# Patient Record
Sex: MALE | Race: WHITE
[De-identification: names, ages, dates, MRNs, and addresses within clinical notes are randomized per-mention and may not be internally consistent; named-entity substitution may affect disease eponyms.]

---

## 2018-12-12 ENCOUNTER — HOSPITAL ENCOUNTER (OUTPATIENT)
Dept: HOSPITAL 19 - COL.RAD | Age: 68
End: 2018-12-12
Attending: UROLOGY
Payer: MEDICARE

## 2018-12-12 DIAGNOSIS — R93.7: ICD-10-CM

## 2018-12-12 DIAGNOSIS — C61: Primary | ICD-10-CM

## 2018-12-12 PROCEDURE — A9503 TC99M MEDRONATE: HCPCS

## 2019-03-01 ENCOUNTER — HOSPITAL ENCOUNTER (INPATIENT)
Dept: HOSPITAL 19 - INPTSU | Age: 69
LOS: 27 days | Discharge: HOME | DRG: 708 | End: 2019-03-28
Attending: UROLOGY | Admitting: UROLOGY
Payer: MEDICARE

## 2019-03-01 VITALS — WEIGHT: 220.46 LBS | HEIGHT: 71 IN | BODY MASS INDEX: 30.86 KG/M2

## 2019-03-01 DIAGNOSIS — E78.5: ICD-10-CM

## 2019-03-01 DIAGNOSIS — I10: ICD-10-CM

## 2019-03-01 DIAGNOSIS — C61: Primary | ICD-10-CM

## 2019-03-01 PROCEDURE — A9284 NON-ELECTRONIC SPIROMETER: HCPCS

## 2019-03-27 VITALS — SYSTOLIC BLOOD PRESSURE: 115 MMHG | HEART RATE: 94 BPM | DIASTOLIC BLOOD PRESSURE: 65 MMHG

## 2019-03-27 VITALS — SYSTOLIC BLOOD PRESSURE: 110 MMHG | HEART RATE: 78 BPM | TEMPERATURE: 98 F | DIASTOLIC BLOOD PRESSURE: 62 MMHG

## 2019-03-27 VITALS — SYSTOLIC BLOOD PRESSURE: 113 MMHG | DIASTOLIC BLOOD PRESSURE: 67 MMHG | TEMPERATURE: 98.2 F | HEART RATE: 92 BPM

## 2019-03-27 VITALS — HEART RATE: 107 BPM | DIASTOLIC BLOOD PRESSURE: 75 MMHG | TEMPERATURE: 100 F | SYSTOLIC BLOOD PRESSURE: 122 MMHG

## 2019-03-27 VITALS — DIASTOLIC BLOOD PRESSURE: 68 MMHG | TEMPERATURE: 98.2 F | SYSTOLIC BLOOD PRESSURE: 117 MMHG | HEART RATE: 82 BPM

## 2019-03-27 VITALS — DIASTOLIC BLOOD PRESSURE: 78 MMHG | TEMPERATURE: 98.2 F | HEART RATE: 93 BPM | SYSTOLIC BLOOD PRESSURE: 124 MMHG

## 2019-03-27 VITALS — SYSTOLIC BLOOD PRESSURE: 112 MMHG | HEART RATE: 82 BPM | DIASTOLIC BLOOD PRESSURE: 61 MMHG

## 2019-03-27 VITALS — DIASTOLIC BLOOD PRESSURE: 71 MMHG | HEART RATE: 81 BPM | TEMPERATURE: 98.4 F | SYSTOLIC BLOOD PRESSURE: 113 MMHG

## 2019-03-27 VITALS — SYSTOLIC BLOOD PRESSURE: 126 MMHG | HEART RATE: 101 BPM | DIASTOLIC BLOOD PRESSURE: 74 MMHG | TEMPERATURE: 97.5 F

## 2019-03-27 VITALS — SYSTOLIC BLOOD PRESSURE: 125 MMHG | HEART RATE: 85 BPM | DIASTOLIC BLOOD PRESSURE: 67 MMHG

## 2019-03-27 VITALS — SYSTOLIC BLOOD PRESSURE: 117 MMHG | HEART RATE: 94 BPM | DIASTOLIC BLOOD PRESSURE: 67 MMHG

## 2019-03-27 VITALS — DIASTOLIC BLOOD PRESSURE: 92 MMHG | SYSTOLIC BLOOD PRESSURE: 148 MMHG | TEMPERATURE: 97.3 F | HEART RATE: 70 BPM

## 2019-03-27 VITALS — TEMPERATURE: 98.3 F

## 2019-03-27 PROCEDURE — 0VT04ZZ RESECTION OF PROSTATE, PERCUTANEOUS ENDOSCOPIC APPROACH: ICD-10-PCS | Performed by: UROLOGY

## 2019-03-27 PROCEDURE — 8E0W4CZ ROBOTIC ASSISTED PROCEDURE OF TRUNK REGION, PERCUTANEOUS ENDOSCOPIC APPROACH: ICD-10-PCS | Performed by: UROLOGY

## 2019-03-27 PROCEDURE — 07TC4ZZ RESECTION OF PELVIS LYMPHATIC, PERCUTANEOUS ENDOSCOPIC APPROACH: ICD-10-PCS | Performed by: UROLOGY

## 2019-03-27 NOTE — NUR
Patient arrived to floor via bed. Abdomen has 5 lapsites and a katheryn drain
site. Denies nausea. Dressing to drain site is C/D/I. Leal secured to leg,
urine output is peach/red tinged with sediment. His wife is at bedside.
Patient denies pain. No other changes at this time. Call light within reach.

## 2019-03-27 NOTE — NUR
5 lap sites are dry and clean, open to air. no pain at site. katheryn drain is
draining minimal drainage at this time. wife at bedside. no needs at this
time. call light in reach

## 2019-03-27 NOTE — NUR
PT RESTIGN IN BED A=Ox4. no pain. no n/v. reports no gas or bm. no needs at
this time. call light inrech

## 2019-03-27 NOTE — NUR
Patient was able to get up to the chair. He is tolerating clear liqdis well.
Denies nausea. No complaints of pain. Incisions remain intace. No other
changes at this time. Call light within reach.

## 2019-03-27 NOTE — NUR
pt incision directly above umbilicus slightly red, open to air. no pain. no
N/V no needs at this time.

## 2019-03-27 NOTE — NUR
DEONNAARIELNT ARRVES AMBULATORY TO BAY 8. ALERT AND ORIENTED X 3. LABS DRAWN. IV
STARTED. VS TAKEN. CONSENTS SIGNED. WIFE AT BEDSIDE. CALL LIGHT WITHIN REACH.
WILL CONTINUE TO MONITOR. WIFE HAS PATIENTS BELONGINGS.

## 2019-03-28 VITALS — DIASTOLIC BLOOD PRESSURE: 72 MMHG | SYSTOLIC BLOOD PRESSURE: 123 MMHG | HEART RATE: 87 BPM | TEMPERATURE: 98.4 F

## 2019-03-28 VITALS — HEART RATE: 84 BPM | TEMPERATURE: 98.2 F | SYSTOLIC BLOOD PRESSURE: 124 MMHG | DIASTOLIC BLOOD PRESSURE: 78 MMHG

## 2019-03-28 VITALS — DIASTOLIC BLOOD PRESSURE: 89 MMHG | TEMPERATURE: 97.5 F | SYSTOLIC BLOOD PRESSURE: 134 MMHG | HEART RATE: 81 BPM

## 2019-03-28 VITALS — HEART RATE: 81 BPM | DIASTOLIC BLOOD PRESSURE: 69 MMHG | TEMPERATURE: 98.3 F | SYSTOLIC BLOOD PRESSURE: 118 MMHG

## 2019-03-28 LAB
ANION GAP SERPL CALC-SCNC: 5 MMOL/L (ref 7–16)
BASOPHILS # BLD: 0 10*3/UL (ref 0–0.2)
BASOPHILS NFR BLD AUTO: 0.1 % (ref 0–2)
BUN SERPL-MCNC: 11 MG/DL (ref 9–20)
CALCIUM SERPL-MCNC: 8.3 MG/DL (ref 8.4–10.2)
CHLORIDE SERPL-SCNC: 104 MMOL/L (ref 98–107)
CO2 SERPL-SCNC: 25 MMOL/L (ref 22–30)
CREAT SERPL-SCNC: 0.78 UMOL/L (ref 0.66–1.25)
EOSINOPHIL # BLD: 0 10*3/UL (ref 0–0.7)
EOSINOPHIL NFR BLD: 0.1 % (ref 0–4)
ERYTHROCYTE [DISTWIDTH] IN BLOOD BY AUTOMATED COUNT: 12.3 % (ref 11.5–14.5)
GLUCOSE SERPL-MCNC: 139 MG/DL (ref 74–106)
GRANULOCYTES # BLD AUTO: 69.7 % (ref 42.2–75.2)
HCT VFR BLD AUTO: 39 % (ref 42–52)
HGB BLD-MCNC: 13.4 G/DL (ref 13.5–18)
LYMPHOCYTES # BLD: 1.9 10*3/UL (ref 1.2–3.4)
LYMPHOCYTES NFR BLD: 19.8 % (ref 20–51)
MCH RBC QN AUTO: 33 PG (ref 27–31)
MCHC RBC AUTO-ENTMCNC: 34 G/DL (ref 33–37)
MCV RBC AUTO: 95 FL (ref 80–100)
MONOCYTES # BLD: 1 10*3/UL (ref 0.1–0.6)
MONOCYTES NFR BLD AUTO: 10 % (ref 1.7–9.3)
NEUTROPHILS # BLD: 6.7 10*3/UL (ref 1.4–6.5)
PLATELET # BLD AUTO: 157 K/MM3 (ref 130–400)
PMV BLD AUTO: 10.6 FL (ref 7.4–10.4)
POTASSIUM SERPL-SCNC: 4.3 MMOL/L (ref 3.4–5)
RBC # BLD AUTO: 4.11 M/MM3 (ref 4.2–5.6)
SODIUM SERPL-SCNC: 134 MMOL/L (ref 137–145)

## 2019-03-28 NOTE — NUR
Spoke with pt, she went to pcp, they wanted to give her some paxil but she was afraid to take it, she just wanted to take more lorazepam.   Pt is feeling better, and will plan on keeping appt for December unless you want to see he before. She just mailed the monitor back also, do not see results yet. jocelyn drain compressed- 35ml out. no needs at this time. call light inreach.
report fiven to GEORGIE martins

## 2019-03-28 NOTE — NUR
Patient has been doing well this am. Denies pain. Has been up walking. Urine
output has been good, urine continues to be tea/rust colored. No complaints of
nausea. Bowel sounds are good, has not passed flatus yet. Minimal
brigh red drainage to ANKIT drain, bulb is to suction. No other changes at this
time. Wife at bedside. Call light within reach.

## 2019-03-28 NOTE — NUR
JEROD met with the patient and patient's wife, Ebony, to discuss discharge plan.
The patient lives in [place] with his wife. He reports independence with
ADLs and does not have any DME. The patient's PCP is Dr. Stan Ham and he
receives his medications at [facility]. He reports no difficulties
obtaining his meds. The patient does not have advanced directives in EMR, but
he states that he does have them completed and at home. The patient plans to
return home with his wife upon discharge. No additional needs at this time.

## 2019-03-28 NOTE — NUR
pt resting in bed with wife at bediside. reports some disconfort but denied
need for intervention. no N/V. no needs at this time. call light in reach

## 2019-03-28 NOTE — NUR
Wife present in room with patient through ot the day. IV removed with inact
catheter. no swelling, no redness noted. pt denies pain. pt discharged and
wheeled out to car with wife.

## 2019-03-28 NOTE — NUR
pt had an uneventful night. minimal pain during night. morris drained freely,
no kinks, secured to leg. urine is tea-colored. pt resting in bed with wife at
bedside. no needs at this time. call light inreach

## 2019-03-28 NOTE — NUR
Patient is discharging home. Discharge instructions discussed with patient.
Mel switched to leg bag for his ride home. Explained to use the large bag
when at home and only use the leg bag when leaving the house and to empty it
regularlly. Explained he has prescriptions to get filled. INT discontinued.
Discontinued ANKIT drain with asmita craig. Patient tolerated that well.
Tegaderm and gauze dressing placed to ANKIT site. Copies of discharge
instructions sent with patient. All belongings packed up and sent with
patient. Patient walked out via wheel chair. The office will be calling the
patient with [facility] appointment.

## 2019-03-28 NOTE — NUR
Pt ambulated 2x this shift tolerated well. Morris still to dependent drainage
with tea colored urine with scant amount of sediment in morris drainage bag.
Wife remains at bedside, call light  within reach. ANKIT drain still intact with
bright red drainage.

## 2019-03-28 NOTE — NUR
Juan C Nunez #1 dicontinued at 1530. dressing applied. patient tolerated well
and denies pain at this time.

## 2019-03-28 NOTE — NUR
pt morris draining independently, no kinks. secure to leg. pt sleeping at this
time. call light in reach

## 2019-04-13 ENCOUNTER — HOSPITAL ENCOUNTER (EMERGENCY)
Dept: HOSPITAL 19 - COL.ER | Age: 69
Discharge: HOME | End: 2019-04-13
Payer: MEDICARE

## 2019-04-13 VITALS — HEIGHT: 70.98 IN | WEIGHT: 215.39 LBS | BODY MASS INDEX: 30.15 KG/M2

## 2019-04-13 VITALS — HEART RATE: 82 BPM | DIASTOLIC BLOOD PRESSURE: 70 MMHG | SYSTOLIC BLOOD PRESSURE: 133 MMHG

## 2019-04-13 VITALS — TEMPERATURE: 98.2 F

## 2019-04-13 DIAGNOSIS — E78.5: ICD-10-CM

## 2019-04-13 DIAGNOSIS — M54.5: Primary | ICD-10-CM

## 2019-04-13 DIAGNOSIS — I10: ICD-10-CM

## 2019-09-11 ENCOUNTER — HOSPITAL ENCOUNTER (OUTPATIENT)
Dept: HOSPITAL 19 - SDCO | Age: 69
Discharge: HOME | End: 2019-09-11
Attending: SURGERY
Payer: MEDICARE

## 2019-09-11 VITALS — SYSTOLIC BLOOD PRESSURE: 136 MMHG | HEART RATE: 86 BPM | DIASTOLIC BLOOD PRESSURE: 82 MMHG

## 2019-09-11 VITALS — SYSTOLIC BLOOD PRESSURE: 146 MMHG | DIASTOLIC BLOOD PRESSURE: 75 MMHG | HEART RATE: 91 BPM

## 2019-09-11 VITALS — SYSTOLIC BLOOD PRESSURE: 132 MMHG | HEART RATE: 91 BPM | DIASTOLIC BLOOD PRESSURE: 76 MMHG

## 2019-09-11 VITALS — SYSTOLIC BLOOD PRESSURE: 137 MMHG | DIASTOLIC BLOOD PRESSURE: 79 MMHG | HEART RATE: 90 BPM

## 2019-09-11 VITALS — WEIGHT: 215.61 LBS | BODY MASS INDEX: 30.19 KG/M2 | HEIGHT: 71 IN

## 2019-09-11 VITALS — HEART RATE: 81 BPM | TEMPERATURE: 97.9 F | DIASTOLIC BLOOD PRESSURE: 74 MMHG | SYSTOLIC BLOOD PRESSURE: 133 MMHG

## 2019-09-11 VITALS — HEART RATE: 66 BPM | TEMPERATURE: 97.6 F | DIASTOLIC BLOOD PRESSURE: 84 MMHG | SYSTOLIC BLOOD PRESSURE: 144 MMHG

## 2019-09-11 DIAGNOSIS — K42.9: ICD-10-CM

## 2019-09-11 DIAGNOSIS — Z79.899: ICD-10-CM

## 2019-09-11 DIAGNOSIS — E78.00: ICD-10-CM

## 2019-09-11 DIAGNOSIS — I10: ICD-10-CM

## 2019-09-11 DIAGNOSIS — Z90.79: ICD-10-CM

## 2019-09-11 DIAGNOSIS — Z87.891: ICD-10-CM

## 2019-09-11 DIAGNOSIS — Z80.1: ICD-10-CM

## 2019-09-11 DIAGNOSIS — Z96.651: ICD-10-CM

## 2019-09-11 DIAGNOSIS — K43.2: Primary | ICD-10-CM

## 2019-09-11 DIAGNOSIS — Z85.46: ICD-10-CM

## 2019-09-11 PROCEDURE — C1781 MESH (IMPLANTABLE): HCPCS

## 2019-09-11 NOTE — NUR
Discharge instructions were reviewed with the patient and his wife at this
time. They both verbalized understanding and have no questions for the nurse
at this time. The patient's IV to his left hand was removed and a pressure
dressing was applied to the site. The patient is going to ambulate to the
bathroom at this time. The nurse instructed the patinet that if he is able to
void successfully to get dressed and notify the staff when he is ready to be
escorted out.

## 2019-09-11 NOTE — NUR
The patient appeared to tolerate the jello well. The patient's wife was
brought back to be at his bedside. Vital signs appear stable. Call light
remains within reach. The patient would like to try a muffin so he can take
some oral pain medication prior to discharge.

## 2019-09-11 NOTE — NUR
The patient was given a PRN dose of Norco one tab at this time. The patient's
vital signs appear stable. Call light remains within reach. Will continue to
monitor the patient.

## 2019-09-11 NOTE — NUR
The patient was escorted out via wheelchair to a private vehicle by GEORGIE Staley.
The patient's belongings and discharge paperwork were sent with him. The
patient's wife is present to drive him home.

## 2021-02-02 NOTE — NUR
The patient arrived back to [place] [facility] from the recovery room at this time. The
patient appears alert and oriented and reports minimal pain at this time. The
patient's post operative vital signs were started at this time. The patient
has three incisions to his abdomen that are covered with singh set and appear
clean, dry, and intact. The patient requests to try some water and jello at
this time. Call light is within reach. Will continue to monitor the patient. Traveling alone

## 2025-06-18 NOTE — NUR
The patient is sitting up in bed eating his muffin and appears to be
tolerating it well. The florence's wife remains at his bedside. Will continue
to monitor the patient. done